# Patient Record
Sex: MALE | URBAN - METROPOLITAN AREA
[De-identification: names, ages, dates, MRNs, and addresses within clinical notes are randomized per-mention and may not be internally consistent; named-entity substitution may affect disease eponyms.]

---

## 2020-12-10 ENCOUNTER — OFFICE VISIT (OUTPATIENT)
Dept: URBAN - METROPOLITAN AREA CLINIC 13 | Facility: CLINIC | Age: 55
End: 2020-12-10

## 2020-12-10 PROCEDURE — 92134 CPTRZ OPH DX IMG PST SGM RTA: CPT | Performed by: OPHTHALMOLOGY

## 2020-12-10 PROCEDURE — 92014 COMPRE OPH EXAM EST PT 1/>: CPT | Performed by: OPHTHALMOLOGY

## 2020-12-10 ASSESSMENT — INTRAOCULAR PRESSURE
OD: 20
OS: 15

## 2020-12-10 NOTE — IMPRESSION/PLAN
Impression: Retinal detachment of eye: H33.20. Left. 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 Discussed diagnosis of RD. Discussed methods for repairing RD including vitrectomy surgery, scleral buckling surgery, and pneumatic retinopexy. Discussed risks and benefits of each procedure. Recommend vitrectomy surgery to repair RD. Informed patient that visual recovery may be limited due to macula off status of RD. Plan: PPV, EL, CRYO, GAS x RD OS

## 2020-12-16 ENCOUNTER — Encounter (OUTPATIENT)
Dept: URBAN - METROPOLITAN AREA EXTERNAL CLINIC 14 | Facility: EXTERNAL CLINIC | Age: 55
End: 2020-12-16

## 2020-12-16 PROCEDURE — 67108 REPAIR DETACHED RETINA: CPT | Performed by: OPHTHALMOLOGY

## 2020-12-17 ENCOUNTER — POST-OPERATIVE VISIT (OUTPATIENT)
Dept: URBAN - METROPOLITAN AREA CLINIC 54 | Facility: CLINIC | Age: 55
End: 2020-12-17

## 2020-12-17 DIAGNOSIS — H33.8 OTHER RETINAL DETACHMENTS: Primary | ICD-10-CM

## 2020-12-17 PROCEDURE — 99024 POSTOP FOLLOW-UP VISIT: CPT | Performed by: OPHTHALMOLOGY

## 2020-12-17 ASSESSMENT — INTRAOCULAR PRESSURE
OS: 23
OD: 22

## 2020-12-17 NOTE — IMPRESSION/PLAN
Impression: S/P PPV, EL, CRYO, GAS x RD OS - 1 Day. Other retinal detachments  H33.8. Plan: Doing well. PF/Oflox QID. FDP x 1 week. Gas precautions RTC 1 week DFE OS

## 2020-12-22 ENCOUNTER — POST-OPERATIVE VISIT (OUTPATIENT)
Dept: URBAN - METROPOLITAN AREA CLINIC 54 | Facility: CLINIC | Age: 55
End: 2020-12-22

## 2020-12-22 DIAGNOSIS — H33.20 SEROUS RETINAL DETACHMENT, UNSPECIFIED EYE: Primary | ICD-10-CM

## 2020-12-22 PROCEDURE — 99024 POSTOP FOLLOW-UP VISIT: CPT | Performed by: OPHTHALMOLOGY

## 2020-12-22 ASSESSMENT — INTRAOCULAR PRESSURE
OD: 16
OS: 21

## 2020-12-22 NOTE — IMPRESSION/PLAN
Impression: S/P PPV, EL, CRYO, GAS x RD OS - 6 Days. Serous retinal detachment, unspecified eye  H33.20. Retina attached. IOP WNL OU. No infection. Patient will DC Ofloxacin and taper off weekly with Prednisolone TID OS. Return in 2 weeks, POS Plan:

## 2021-01-08 ENCOUNTER — POST-OPERATIVE VISIT (OUTPATIENT)
Dept: URBAN - METROPOLITAN AREA CLINIC 54 | Facility: CLINIC | Age: 56
End: 2021-01-08

## 2021-01-08 PROCEDURE — 99024 POSTOP FOLLOW-UP VISIT: CPT | Performed by: OPHTHALMOLOGY

## 2021-01-08 ASSESSMENT — INTRAOCULAR PRESSURE
OS: 20
OD: 16

## 2021-01-08 NOTE — IMPRESSION/PLAN
Impression: S/P PPV, EL, CRYO, GAS x RD OS - 23 Days. Serous retinal detachment, unspecified eye  H33.20. Retina remains attached. IOP WNL OU. No infection. Exam/OCT reveals no ERM/ME OS. Patient is doing well. Discussed may need to undergo Yag laser for PCO after RD repair. Patient will be applying for short term disability as he is unable to work at this time due to impaired vision in left eye. Discussed with patient may take 6-8 weeks before we know final visual outcome as need to wait for gas bubble to resolve and may need to have Yag laser of PCO OS. Return in 2 weeks Plan:

## 2021-01-18 ENCOUNTER — POST-OPERATIVE VISIT (OUTPATIENT)
Dept: URBAN - METROPOLITAN AREA CLINIC 54 | Facility: CLINIC | Age: 56
End: 2021-01-18

## 2021-01-18 PROCEDURE — 99024 POSTOP FOLLOW-UP VISIT: CPT | Performed by: OPHTHALMOLOGY

## 2021-01-18 ASSESSMENT — INTRAOCULAR PRESSURE
OS: 25
OD: 20

## 2021-01-18 NOTE — IMPRESSION/PLAN
Impression: S/P PPV, EL, CRYO, GAS x RD   OS OS - 33 Days. Serous retinal detachment, unspecified eye  H33.20. Plan: Retina remains attached. IOP WNL OU. No infection. Exam/OCT reveals no ERM/ME OS. Patient is doing well. Discussed may need to undergo Yag laser for PCO after RD repair. Patient will be applying for short term disability as he is unable to work at this time due to impaired vision in left eye. Discussed with patient may take 6-8 weeks before we know final visual outcome as need to wait for gas bubble to resolve and may need to have Yag laser of PCO OS. Timolol sample given to patient. BID OS. Return in 2 weeks

## 2021-02-02 ENCOUNTER — POST-OPERATIVE VISIT (OUTPATIENT)
Dept: URBAN - METROPOLITAN AREA CLINIC 54 | Facility: CLINIC | Age: 56
End: 2021-02-02

## 2021-02-02 DIAGNOSIS — H33.012 RETINAL DETACHMENT WITH SINGLE BREAK, LEFT EYE: Primary | ICD-10-CM

## 2021-02-02 PROCEDURE — 99024 POSTOP FOLLOW-UP VISIT: CPT | Performed by: OPHTHALMOLOGY

## 2021-02-02 ASSESSMENT — INTRAOCULAR PRESSURE
OS: 18
OD: 13

## 2021-02-02 NOTE — IMPRESSION/PLAN
Impression: S/P PPV, EL, CRYO, GAS x RD OS - 48 Days. Retinal detachment with single break, left eye  H33.012. Plan: Retina remains attached. IOP improved. Vision remains blurred OS. Has developed PCO OS. Recommend patient return to see Dr. Brandie Daniels for Yag laser treatment. Discussed with patient vision may remain impaired due to macula-off status of RD at presentation. Patient wishes to DC Timolol and will followed up with Dr. Brandie Daniels to recheck IOP. Return PRN, OCT OU